# Patient Record
Sex: MALE | Race: WHITE | NOT HISPANIC OR LATINO | URBAN - METROPOLITAN AREA
[De-identification: names, ages, dates, MRNs, and addresses within clinical notes are randomized per-mention and may not be internally consistent; named-entity substitution may affect disease eponyms.]

---

## 2020-10-12 ENCOUNTER — IMPORTED ENCOUNTER (OUTPATIENT)
Dept: URBAN - METROPOLITAN AREA CLINIC 50 | Facility: CLINIC | Age: 65
End: 2020-10-12

## 2020-10-14 ENCOUNTER — IMPORTED ENCOUNTER (OUTPATIENT)
Dept: URBAN - METROPOLITAN AREA CLINIC 50 | Facility: CLINIC | Age: 65
End: 2020-10-14

## 2020-10-14 NOTE — PATIENT DISCUSSION
"""Monitor ERM for changes. Informed patient of potential for worsening. Instructed patient to call with changes in vision.  Recommend regular Amsler grid checks. "" ""OCT Macula: OD: Abnormal

## 2021-04-17 ASSESSMENT — VISUAL ACUITY
OD_CC: J2@ 14 IN
OD_PH: @ 14 IN
OS_SC: 20/25-1
OS_CC: J2@ 14 IN
OS_PH: @ 14 IN
OD_SC: 20/20-1

## 2021-04-17 ASSESSMENT — TONOMETRY
OD_IOP_MMHG: 12
OS_IOP_MMHG: 12

## 2021-10-12 ENCOUNTER — PREPPED CHART (OUTPATIENT)
Dept: URBAN - METROPOLITAN AREA CLINIC 50 | Facility: CLINIC | Age: 66
End: 2021-10-12

## 2021-10-12 NOTE — PATIENT DISCUSSION
"""Dry eyes OU seen on todays exam."" ""Start Artificial tears both eyes two - four times a day . ""."

## 2021-10-14 ENCOUNTER — ANNUAL COMPREHENSIVE EXAM (OUTPATIENT)
Dept: URBAN - METROPOLITAN AREA CLINIC 50 | Facility: CLINIC | Age: 66
End: 2021-10-14

## 2021-10-14 DIAGNOSIS — H10.32: ICD-10-CM

## 2021-10-14 DIAGNOSIS — H35.373: ICD-10-CM

## 2021-10-14 DIAGNOSIS — H16.223: ICD-10-CM

## 2021-10-14 DIAGNOSIS — H43.811: ICD-10-CM

## 2021-10-14 DIAGNOSIS — H43.392: ICD-10-CM

## 2021-10-14 PROCEDURE — 92014 COMPRE OPH EXAM EST PT 1/>: CPT

## 2021-10-14 PROCEDURE — 92134 CPTRZ OPH DX IMG PST SGM RTA: CPT

## 2021-10-14 ASSESSMENT — VISUAL ACUITY
OU_SC: J1+/-
OD_SC: 20/20-
OS_SC: 20/20-

## 2021-10-14 ASSESSMENT — TONOMETRY
OS_IOP_MMHG: 14
OD_IOP_MMHG: 14

## 2021-10-14 NOTE — PATIENT DISCUSSION
Discussed with patient that if he is bothered by his floaters we can send him to [facility] for consultation on possible laser to help with the floaters. Patient defers at this time.  Patient will call if he chooses to proceed with referral.